# Patient Record
Sex: MALE | Race: OTHER | HISPANIC OR LATINO | Employment: UNEMPLOYED | URBAN - METROPOLITAN AREA
[De-identification: names, ages, dates, MRNs, and addresses within clinical notes are randomized per-mention and may not be internally consistent; named-entity substitution may affect disease eponyms.]

---

## 2024-03-07 ENCOUNTER — HOSPITAL ENCOUNTER (EMERGENCY)
Facility: HOSPITAL | Age: 24
Discharge: HOME | End: 2024-03-07
Attending: STUDENT IN AN ORGANIZED HEALTH CARE EDUCATION/TRAINING PROGRAM

## 2024-03-07 VITALS
WEIGHT: 169 LBS | SYSTOLIC BLOOD PRESSURE: 152 MMHG | DIASTOLIC BLOOD PRESSURE: 89 MMHG | HEIGHT: 67 IN | HEART RATE: 88 BPM | RESPIRATION RATE: 18 BRPM | BODY MASS INDEX: 26.53 KG/M2 | OXYGEN SATURATION: 100 % | TEMPERATURE: 98 F

## 2024-03-07 DIAGNOSIS — R06.02 SHORTNESS OF BREATH: ICD-10-CM

## 2024-03-07 DIAGNOSIS — R07.9 CHEST PAIN: ICD-10-CM

## 2024-03-07 DIAGNOSIS — J18.9 COMMUNITY ACQUIRED PNEUMONIA OF LEFT UPPER LOBE OF LUNG: Primary | ICD-10-CM

## 2024-03-07 LAB
GROUP A STREP, MOLECULAR: NEGATIVE
INFLUENZA A, MOLECULAR: NEGATIVE
INFLUENZA B, MOLECULAR: NEGATIVE
SARS-COV-2 RDRP RESP QL NAA+PROBE: NEGATIVE
SPECIMEN SOURCE: NORMAL

## 2024-03-07 PROCEDURE — 93005 ELECTROCARDIOGRAM TRACING: CPT

## 2024-03-07 PROCEDURE — 93010 ELECTROCARDIOGRAM REPORT: CPT | Mod: ,,, | Performed by: INTERNAL MEDICINE

## 2024-03-07 PROCEDURE — 87502 INFLUENZA DNA AMP PROBE: CPT | Performed by: PHYSICIAN ASSISTANT

## 2024-03-07 PROCEDURE — U0002 COVID-19 LAB TEST NON-CDC: HCPCS | Performed by: PHYSICIAN ASSISTANT

## 2024-03-07 PROCEDURE — 99284 EMERGENCY DEPT VISIT MOD MDM: CPT | Mod: 25

## 2024-03-07 PROCEDURE — 87651 STREP A DNA AMP PROBE: CPT | Performed by: NURSE PRACTITIONER

## 2024-03-07 PROCEDURE — 25000003 PHARM REV CODE 250: Performed by: NURSE PRACTITIONER

## 2024-03-07 RX ORDER — FLUTICASONE PROPIONATE 50 MCG
1 SPRAY, SUSPENSION (ML) NASAL 2 TIMES DAILY PRN
Qty: 15 G | Refills: 0 | Status: SHIPPED | OUTPATIENT
Start: 2024-03-07

## 2024-03-07 RX ORDER — IBUPROFEN 600 MG/1
600 TABLET ORAL EVERY 6 HOURS PRN
Qty: 20 TABLET | Refills: 0 | Status: SHIPPED | OUTPATIENT
Start: 2024-03-07

## 2024-03-07 RX ORDER — AZITHROMYCIN 250 MG/1
TABLET, FILM COATED ORAL
Qty: 6 TABLET | Refills: 0 | Status: SHIPPED | OUTPATIENT
Start: 2024-03-07 | End: 2024-03-12

## 2024-03-07 RX ORDER — IBUPROFEN 600 MG/1
600 TABLET ORAL
Status: COMPLETED | OUTPATIENT
Start: 2024-03-07 | End: 2024-03-07

## 2024-03-07 RX ADMIN — IBUPROFEN 600 MG: 600 TABLET, FILM COATED ORAL at 05:03

## 2024-03-07 NOTE — ED PROVIDER NOTES
Encounter Date: 3/7/2024       History     Chief Complaint   Patient presents with    Shortness of Breath     Difficulty taking breathing, nasal congestion, body aches, left side chest pain when breathing, and sore throat.      Male presents to the ED for body aches, fatigue, nasal congestion, sore throat, chest pain, and shortness of breath.  The patient reports that he has had left-sided chest pain especially with deep breaths for the past 22 days.  After developing nasal congestion and sore throat today he decided come to the emergency department.  He denies past medical history of asthma and pneumonia.  He has taken Tylenol for his symptoms without relief.  No past medical history of DVT or PE.  The patient denies recent travel and hormone use.  No other complaints at this time.    The history is provided by the patient. The history is limited by a language barrier. A  was used.     Review of patient's allergies indicates:  No Known Allergies  No past medical history on file.  No past surgical history on file.  No family history on file.     Review of Systems   Constitutional:  Positive for activity change, appetite change, chills and fatigue. Negative for fever.   HENT:  Positive for congestion, rhinorrhea and sore throat. Negative for facial swelling, nosebleeds and trouble swallowing.    Respiratory:  Positive for cough and shortness of breath. Negative for chest tightness.    Cardiovascular:  Positive for chest pain.   Gastrointestinal:  Negative for abdominal pain, diarrhea, nausea and vomiting.   Musculoskeletal:  Negative for back pain, joint swelling, myalgias and neck pain.   Skin: Negative.    Neurological:  Negative for weakness and headaches.   Psychiatric/Behavioral:  Negative for confusion.    All other systems reviewed and are negative.      Physical Exam     Initial Vitals [03/07/24 1531]   BP Pulse Resp Temp SpO2   (!) 152/89 88 18 98.3 °F (36.8 °C) 100 %      MAP       --          Physical Exam    Nursing note and vitals reviewed.  Constitutional: Vital signs are normal. He appears well-developed and well-nourished. He is active and cooperative. He is easily aroused.  Non-toxic appearance. He does not have a sickly appearance. He does not appear ill. No distress.   HENT:   Head: Normocephalic and atraumatic.   Right Ear: External ear normal.   Left Ear: External ear normal.   Nose: Rhinorrhea present. No mucosal edema or sinus tenderness.   Mouth/Throat: Uvula is midline and mucous membranes are normal. Posterior oropharyngeal erythema present. No oropharyngeal exudate, posterior oropharyngeal edema or tonsillar abscesses.   Eyes: Conjunctivae are normal.   Neck: Trachea normal and phonation normal. Neck supple.   Normal range of motion.   Full passive range of motion without pain.     Cardiovascular:  Normal rate, regular rhythm and normal heart sounds.           Pulmonary/Chest: Effort normal and breath sounds normal. Chest wall is not dull to percussion. He exhibits no mass, no tenderness, no bony tenderness, no laceration, no crepitus, no edema, no deformity, no swelling and no retraction.     Reported area of pain.    Abdominal: Abdomen is soft. Bowel sounds are normal. He exhibits no distension. There is no abdominal tenderness. There is no rigidity, no rebound and no guarding.   Musculoskeletal:      Cervical back: Full passive range of motion without pain, normal range of motion and neck supple.     Neurological: He is alert, oriented to person, place, and time and easily aroused. He has normal strength. Coordination normal. GCS eye subscore is 4. GCS verbal subscore is 5. GCS motor subscore is 6.   Skin: Skin is warm, dry and intact. No bruising and no rash noted.   Psychiatric: He has a normal mood and affect. His speech is normal and behavior is normal. Judgment and thought content normal. Cognition and memory are normal.         ED Course   Procedures  Labs Reviewed    INFLUENZA A & B BY MOLECULAR   GROUP A STREP, MOLECULAR   SARS-COV-2 RNA AMPLIFICATION, QUAL          Imaging Results              X-Ray Chest PA And Lateral (Final result)  Result time 03/07/24 17:33:52      Final result by Giovani Eng DO (03/07/24 17:33:52)                   Impression:      Stable opacity in the left lung apex, which may represent atelectasis or pneumonia.  Recommend follow-up after treatment to ensure complete resolution.      Electronically signed by: Giovani Eng  Date:    03/07/2024  Time:    17:33               Narrative:    EXAMINATION:  XR CHEST PA AND LATERAL    CLINICAL HISTORY:  Chest pain, unspecified    TECHNIQUE:  PA and lateral views of the chest were performed.    COMPARISON:  Radiograph from earlier today.    FINDINGS:  Stable airspace opacity in the left lung apex.  The opacity is not well seen on the lateral view, noting that the lung apices are not well evaluated due to artifact from overlying soft tissue structures.  The lungs are otherwise clear.  The pleural spaces are clear.  Osseous structures are intact.                                       X-Ray Chest 1 View (Final result)  Result time 03/07/24 16:45:58      Final result by Yelitza Mo MD (03/07/24 16:45:58)                   Impression:      Small area of increased attenuation left upper lobe possibly subsegmental atelectasis or airspace disease.  Pneumonia could have this appearance.  Follow-up PA and lateral chest radiographs after treatment completed to ensure complete resolution.      Electronically signed by: Yelitza Mo MD  Date:    03/07/2024  Time:    16:45               Narrative:    EXAMINATION:  XR CHEST 1 VIEW    CLINICAL HISTORY:  Shortness of breath    TECHNIQUE:  Single frontal view of the chest was performed.    COMPARISON:  None    FINDINGS:  The cardiac silhouette is normal in size.  The pulmonary vascularity is normal.    Small area of increased attenuation left upper  lobe.    No pleural effusion, no pneumothorax.    The osseous structures appear normal.                                       Medications   ibuprofen tablet 600 mg (600 mg Oral Given 3/7/24 1718)     Medical Decision Making  23-year-old male here for left-sided chest pain for 22 days.  He reports associated shortness of breath.  Reports body aches, fatigue, nasal congestion, and sore throat starting today.  He has had chills.  The patient is well-appearing.  He has nasal rhinorrhea.  Vitals are stable.  No respiratory distress.  Lungs are clear to auscultation bilaterally.  Comfortable work of breathing.    Differential includes viral, COVID, influenza, pneumonia, pneumothorax    The patient is PERC negative and I do not suspect PE.    Chest x-ray shows possible left apex infiltrate.  The patient's oxygen saturation is 100% on room air.  He will be treated for CPAP.  COVID and influenza are negative.  Strep negative.  The patient reports that he has a follow-up appointment with the clinic on March 14th.  I encouraged him to keep that appointment.    Pt to follow up with PCP within 7 days.  I reviewed strict return precautions. In addition, pt is to return to the ED if condition changes, progresses, or if there are any concerns.  Pt verbalized understanding, compliance, and agreement with the treatment plan.        Amount and/or Complexity of Data Reviewed  Radiology: ordered.    Risk  Prescription drug management.                                      Clinical Impression:  Final diagnoses:  [R06.02] Shortness of breath  [R07.9] Chest pain  [J18.9] Community acquired pneumonia of left upper lobe of lung (Primary)          ED Disposition Condition    Discharge Stable          ED Prescriptions       Medication Sig Dispense Start Date End Date Auth. Provider    azithromycin (Z-HECTOR) 250 MG tablet Take 2 tablets by mouth on day 1; Take 1 tablet by mouth on days 2-5 6 tablet 3/7/2024 3/12/2024 Kamini Kebede FNP     ibuprofen (ADVIL,MOTRIN) 600 MG tablet Take 1 tablet (600 mg total) by mouth every 6 (six) hours as needed for Pain. 20 tablet 3/7/2024 -- Kamini Kebede FNP    fluticasone propionate (FLONASE) 50 mcg/actuation nasal spray 1 spray (50 mcg total) by Each Nostril route 2 (two) times daily as needed for Rhinitis. 15 g 3/7/2024 -- Kamini Kebede FNP          Follow-up Information       Follow up With Specialties Details Why Contact [facility], [facility], Psychology, Family Medicine, Obstetrics Schedule an appointment as soon as possible for a visit in 1 week  [address]  [address]  [facility] [address]  [phone]               Kamini Kebede FNP  03/07/24 9100

## 2024-03-07 NOTE — ED NOTES
Patient identifiers verified and correct.   Pt verified by name and date of birth      LOC: The patient is awake, alert and aware of environment with an appropriate affect, acting appropriate for age.      APPEARANCE: Patient appears comfortable and in no acute distress, patient is clean and well groomed.     HEENT: Head symmetrical. Eyes bilateral.  Bilateral ears without drainage. Bilateral nares patent. Pt denies headache states soreness to throat with nasal congestion      SKIN: The skin is warm and dry, color consistent with ethnicity, patient has normal skin turgor and moist mucus membranes, skin intact, no bruising noted.      MUSCULOSKELETAL: Patient moving all extremities spontaneously, no swelling noted. Pt states body aches for a couple of days     RESPIRATORY: Airway is open and patent, respirations are spontaneous, patient has a normal effort and rate, no accessory muscle use noted.  Pt states pain to left side chest/ rib area for 6 days denies injury x's 22 days, states painful with taking deep breath      CARDIAC: Patient has a normal rate and regular rhythm, no edema noted, capillary refill < 3 seconds. Pt denies chest pain or discomfort     GASTRO: Abdomen soft and non-distended. Denies ABD pain     NEURO: Pt opens eyes spontaneously pupils equal, round, and reactive. behavior appropriate to situation, facial expression symmetrical,   bilateral hand grasp equal and even, purposeful motor response noted, normal sensation in all extremities when touched with a finger.     NEUROVASCULAR: All extremities are warm and pin

## 2024-03-09 LAB
OHS QRS DURATION: 106 MS
OHS QTC CALCULATION: 403 MS